# Patient Record
(demographics unavailable — no encounter records)

---

## 2025-02-18 NOTE — DISCUSSION/SUMMARY
[FreeTextEntry1] : Mr Trejo has mild COPD.  Even though he has had recent mold exposure does not seem to have a change in his overall respiratory status.  We will continue Spiriva 2 sprays daily.  I have asked him to use the albuterol on an as-needed basis.  The patient gets routine PET scans at [facility] because of his history of esophageal carcinoma so no screening CAT scans are indicated.  He will return to see me in 6 months. The patient understands and agrees with plan of care. Today's office visit encompassed 32 minutes which excludes teaching and separately reported services.. I conducted an extensive history, physical exam and reviewed diagnosis and treatment options including diagnostic tests,radiology studies including cat scans and the use of prescription medication.

## 2025-02-18 NOTE — HISTORY OF PRESENT ILLNESS
[Former] : former [Never] : never [Current] : current [TextBox_11] : 1 [TextBox_4] : 60  male hx copd  and esophageal ca stage 3 today feels good  recent mold exposure and sl cough bec of exposure to pt with  urti hx mold    lives in [facility] and noted mold exposure   bec  leak in [facility]   ++ inc sob   no wheeze cp or tightness no cough no wt loss  disability carpentry/maintenance  use albuterol daily and spiriva daily [TextBox_13] : 40 [YearQuit] : 2023

## 2025-02-18 NOTE — REASON FOR VISIT
[Follow-Up] : a follow-up visit [Asthma] : asthma [COPD] : COPD [TextEntry] : Pt here for 6 month follow-up PFT Done. Has been exposed to mold recently and has a few breathing complaints.